# Patient Record
Sex: FEMALE | Race: WHITE | Employment: PART TIME | URBAN - METROPOLITAN AREA
[De-identification: names, ages, dates, MRNs, and addresses within clinical notes are randomized per-mention and may not be internally consistent; named-entity substitution may affect disease eponyms.]

---

## 2022-06-21 ENCOUNTER — TELEPHONE (OUTPATIENT)
Dept: PULMONOLOGY | Age: 47
End: 2022-06-21

## 2022-06-21 DIAGNOSIS — R06.02 SOB (SHORTNESS OF BREATH): Primary | ICD-10-CM

## 2022-07-06 ENCOUNTER — HOSPITAL ENCOUNTER (OUTPATIENT)
Dept: PULMONOLOGY | Age: 47
Discharge: HOME | End: 2022-07-06
Payer: COMMERCIAL

## 2022-07-06 DIAGNOSIS — R06.02 SOB (SHORTNESS OF BREATH): ICD-10-CM

## 2022-07-06 PROCEDURE — 94060 EVALUATION OF WHEEZING: CPT

## 2022-07-06 PROCEDURE — 94726 PLETHYSMOGRAPHY LUNG VOLUMES: CPT

## 2022-07-06 PROCEDURE — 94729 DIFFUSING CAPACITY: CPT

## 2022-07-06 PROCEDURE — 94664 DEMO&/EVAL PT USE INHALER: CPT

## 2022-07-06 PROCEDURE — 94761 N-INVAS EAR/PLS OXIMETRY MLT: CPT

## 2022-07-06 PROCEDURE — 6360000002 HC RX W HCPCS: Performed by: INTERNAL MEDICINE

## 2022-07-06 RX ORDER — ALBUTEROL SULFATE 2.5 MG/3ML
2.5 SOLUTION RESPIRATORY (INHALATION) ONCE
Status: COMPLETED | OUTPATIENT
Start: 2022-07-06 | End: 2022-07-06

## 2022-07-06 RX ADMIN — ALBUTEROL SULFATE 2.5 MG: 2.5 SOLUTION RESPIRATORY (INHALATION) at 10:39

## 2022-07-08 NOTE — PROCEDURES
[address]               [address] [place], [address]                               PULMONARY FUNCTION    PATIENT NAME: Christiano Hammond                    :        1975  MED REC NO:   [id number]                          ROOM:  ACCOUNT NO:   [de-identified]                           ADMIT DATE: 2022  PROVIDER:     Arie Varner MD    DATE OF PROCEDURE:  2022    INDICATION:  Shortness of breath. BMI:  24.3. TOBACCO HISTORY:  Never smoked. Spirometry data is acceptable and reproducible. Lung volumes performed via plethysmography. Albuterol given per protocol. Room air oxygen saturation is 97%. SPIROMETRY:  FEV1/FVC ratio is 73%. Prebronchodilator FEV1 is 2.41,  which is 87% of predicted. Prebronchodilator FVC is 3.32, which is 95%  of predicted. FEF 25%-75% is 1.78, which is 63% of predicted. Postbronchodilator FEF 25%-75% is 2.35, which is 84% of predicted for  32% increase. Lung volumes showed total lung capacity of 4.95, which is 101% of  predicted. Residual volume is 1.72, which is 101% of predicted. Diffusion capacity is 18.29, which is 71% of predicted. IMPRESSION:  1. No evidence of large airway obstruction. 2.  There is decrease flow in small airways that has a significant  response to bronchodilators. 3.  Normal lung volumes. 4.  Mild decrease in diffusion capacity. Asthma could be a possibility if clinically suspected. Bronchial  challenge could be performed for further evaluation.         Criss Alcaraz MD    D: 2022 16:20:05       T: 2022 21:33:48     [email or web address]  Job#: [id number]     Doc#: [id number]    CC:

## 2022-08-15 ENCOUNTER — OFFICE VISIT (OUTPATIENT)
Dept: PULMONOLOGY | Age: 47
End: 2022-08-15
Payer: COMMERCIAL

## 2022-08-15 ENCOUNTER — HOSPITAL ENCOUNTER (OUTPATIENT)
Age: 47
Discharge: HOME | End: 2022-08-15
Payer: COMMERCIAL

## 2022-08-15 ENCOUNTER — HOSPITAL ENCOUNTER (OUTPATIENT)
Dept: GENERAL RADIOLOGY | Age: 47
Discharge: HOME | End: 2022-08-15
Payer: COMMERCIAL

## 2022-08-15 VITALS
OXYGEN SATURATION: 100 % | BODY MASS INDEX: 24.98 KG/M2 | TEMPERATURE: 96.6 F | DIASTOLIC BLOOD PRESSURE: 76 MMHG | HEART RATE: 74 BPM | HEIGHT: 63 IN | SYSTOLIC BLOOD PRESSURE: 101 MMHG | WEIGHT: 141 LBS

## 2022-08-15 DIAGNOSIS — R06.02 SOB (SHORTNESS OF BREATH): ICD-10-CM

## 2022-08-15 DIAGNOSIS — J45.20 MILD INTERMITTENT REACTIVE AIRWAY DISEASE WITHOUT COMPLICATION: Primary | ICD-10-CM

## 2022-08-15 DIAGNOSIS — J45.20 MILD INTERMITTENT REACTIVE AIRWAY DISEASE WITHOUT COMPLICATION: ICD-10-CM

## 2022-08-15 LAB — SEDIMENTATION RATE, ERYTHROCYTE: 7 MM/HR (ref 0–20)

## 2022-08-15 PROCEDURE — 99204 OFFICE O/P NEW MOD 45 MIN: CPT | Performed by: INTERNAL MEDICINE

## 2022-08-15 PROCEDURE — 71046 X-RAY EXAM CHEST 2 VIEWS: CPT

## 2022-08-15 RX ORDER — FLUTICASONE FUROATE AND VILANTEROL 200; 25 UG/1; UG/1
1 POWDER RESPIRATORY (INHALATION) DAILY
Qty: 1 EACH | Refills: 11 | Status: SHIPPED | OUTPATIENT
Start: 2022-08-15 | End: 2022-11-04

## 2022-08-15 RX ORDER — MONTELUKAST SODIUM 10 MG/1
TABLET ORAL
COMMUNITY
End: 2022-08-15

## 2022-08-15 RX ORDER — LORATADINE 10 MG/1
10 TABLET ORAL DAILY
Qty: 30 TABLET | Refills: 1 | Status: SHIPPED | OUTPATIENT
Start: 2022-08-15

## 2022-08-15 RX ORDER — FLUTICASONE PROPIONATE 50 MCG
2 SPRAY, SUSPENSION (ML) NASAL DAILY
Qty: 16 G | Refills: 1 | Status: SHIPPED | OUTPATIENT
Start: 2022-08-15 | End: 2022-10-17

## 2022-08-15 ASSESSMENT — ENCOUNTER SYMPTOMS
WHEEZING: 0
CHEST TIGHTNESS: 0
COUGH: 0
SHORTNESS OF BREATH: 0

## 2022-08-15 NOTE — PROGRESS NOTES
[facility] Pulmonary and Critical Care    Outpatient Note    Subjective:   CHIEF COMPLAINT: No chief complaint on file. HPI:     The patient is 49 y.o. female who presents today for a new patient visit for evaluation of cough which she had on and off over the past few months. She had COVID in Jan 2021, she recovered from it without issues. Start having this cough in August 2021. This went on 1st for 3 months. X ray was negative. She was treated with amoxicillin and prednisone. It got better. However this happened three times. Every time it gets better with steroids. .    Cough is productive with white to green sputum. In June she had \"asthma attack\", she was treated with albuterol. However she used it only twice. At that time it started when she was working outside doing her flower bed when she developed chest pain,  tightness and cough. There was no fever. SOB continued for whole week. She was unable to lay flat. Pulse ox 88% and at that time she had significant post nasal drip  Cough is mainly in am and in the evening. She has nasal allergies. It is described as nasal congestion with possible seasonal variation. Rarely she has GERD  She has indoor can. She also has a dog. Past Medical History:    History reviewed. No pertinent past medical history. Social History:    Social History     Tobacco Use   Smoking Status Never   Smokeless Tobacco Never       Family History:  History reviewed. No pertinent family history. Current Medications:  Current Outpatient Medications on File Prior to Visit   Medication Sig Dispense Refill    montelukast (SINGULAIR) 10 MG tablet montelukast 10 mg tablet       No current facility-administered medications on file prior to visit. REVIEW OF SYSTEMS:    Review of Systems   Constitutional:  Negative for chills, fatigue, fever and unexpected weight change. HENT:  Negative for congestion.     Respiratory:  Negative for cough, chest tightness, shortness of breath and wheezing. Cardiovascular:  Negative for chest pain, palpitations and leg swelling. Neurological:  Negative for weakness. Psychiatric/Behavioral:  The patient is not nervous/anxious. All other systems reviewed and are negative. Objective:   PHYSICAL EXAM:        VITALS:  /76 (Site: Right Upper Arm, Position: Sitting, Cuff Size: Medium Adult)   Pulse 74   Temp (!) 96.6 °F (35.9 °C) (Infrared)   Ht 5' 3\" (1.6 m)   Wt 141 lb (64 kg)   SpO2 100%   BMI 24.98 kg/m²     Physical Exam  Vitals reviewed. Constitutional:       Appearance: She is well-developed. HENT:      Head: Normocephalic and atraumatic. Eyes:      Pupils: Pupils are equal, round, and reactive to light. Neck:      Vascular: No JVD. Cardiovascular:      Rate and Rhythm: Normal rate and regular rhythm. Heart sounds: No murmur heard. Pulmonary:      Effort: Pulmonary effort is normal. No respiratory distress. Breath sounds: Normal breath sounds. No stridor. No wheezing or rales. Abdominal:      General: Bowel sounds are normal.      Palpations: Abdomen is soft. Musculoskeletal:         General: No deformity. Cervical back: Neck supple. Right lower leg: No edema. Left lower leg: No edema. Skin:     General: Skin is warm and dry. Neurological:      Mental Status: She is alert and oriented to person, place, and time. Psychiatric:         Behavior: Behavior normal.       DATA:    CXR on 8/15/22  Impression: No acute cardiopulmonary normality. PFT 07/07/2022     INDICATION:  Shortness of breath. BMI:  24.3. TOBACCO HISTORY:  Never smoked. Spirometry data is acceptable and reproducible. Lung volumes performed via plethysmography. Albuterol given per protocol. Room air oxygen saturation is 97%. SPIROMETRY:  FEV1/FVC ratio is 73%. Prebronchodilator FEV1 is 2.41,  which is 87% of predicted.   Prebronchodilator FVC is 3.32, which is 95%  of predicted. FEF 25%-75% is 1.78, which is 63% of predicted. Postbronchodilator FEF 25%-75% is 2.35, which is 84% of predicted for  32% increase. Lung volumes showed total lung capacity of 4.95, which is 101% of  predicted. Residual volume is 1.72, which is 101% of predicted. Diffusion capacity is 18.29, which is 71% of predicted. IMPRESSION:  1. No evidence of large airway obstruction. 2.  There is decrease flow in small airways that has a significant  response to bronchodilators. 3.  Normal lung volumes. 4.  Mild decrease in diffusion capacity. Asthma could be a possibility if clinically suspected. Bronchial  challenge could be performed for further evaluation    Assessment:      Diagnosis Orders   1. Mild intermittent reactive airway disease without complication  CBC with Auto Differential    Sedimentation Rate    Allergen Profile, Mold    Allergen House Dust Trino    Cat hair, dander, standard IgE    Dog Dander IgE    ALLERGEN COCKROACH, AMERICAN IGE          Plan:   49year old female with recurrent episodes of productive cough with chest tightness and wheezing that usually respond well to prednisone. It is associated with significant post nasal drip. There is no hx of asthma, however she has hx consistent with rhinitis (allergic vs vasomotor)   PFT is suggestive of reactive airway disease. Mild obstruction normalized with bronchodilator. There was 14% reversibility in FEF 25-75, however there was only 6% improvement in FEV1. CXR is within normal.      Suspect reactive airway disease due to post nasal drip vs cough variant asthma with allergic rhinitis.       Plan   Flonase  Claritin   Breo  D/c Singulair   Check sed rate, CBC and common asthma allergens

## 2022-08-17 LAB
ALLERGEN AMERICAN COCKROACH (PERIPLANETA AMERICANA) IGE: <0.1 KU/L
ALLERGEN HOUSE DUST STIER IGE: <0.1 KU/L
ALLERGEN SEE NOTE: NORMAL

## 2022-08-19 LAB — CAT DANDER ANTIBODY: <0.1 KU/L (ref 0–0.34)

## 2022-10-17 RX ORDER — FLUTICASONE PROPIONATE 50 MCG
SPRAY, SUSPENSION (ML) NASAL
Qty: 16 G | Refills: 2 | Status: SHIPPED | OUTPATIENT
Start: 2022-10-17 | End: 2022-11-04 | Stop reason: SDUPTHER

## 2022-11-04 ENCOUNTER — OFFICE VISIT (OUTPATIENT)
Dept: PULMONOLOGY | Age: 47
End: 2022-11-04
Payer: COMMERCIAL

## 2022-11-04 VITALS
BODY MASS INDEX: 24.98 KG/M2 | DIASTOLIC BLOOD PRESSURE: 76 MMHG | HEART RATE: 87 BPM | SYSTOLIC BLOOD PRESSURE: 111 MMHG | OXYGEN SATURATION: 99 % | WEIGHT: 141 LBS | HEIGHT: 63 IN

## 2022-11-04 DIAGNOSIS — J45.20 MILD INTERMITTENT ASTHMA WITHOUT COMPLICATION: Primary | ICD-10-CM

## 2022-11-04 DIAGNOSIS — J30.9 ALLERGIC RHINITIS, UNSPECIFIED SEASONALITY, UNSPECIFIED TRIGGER: ICD-10-CM

## 2022-11-04 PROCEDURE — 99214 OFFICE O/P EST MOD 30 MIN: CPT | Performed by: INTERNAL MEDICINE

## 2022-11-04 RX ORDER — ALBUTEROL SULFATE 90 UG/1
2 AEROSOL, METERED RESPIRATORY (INHALATION) 4 TIMES DAILY PRN
Qty: 18 G | Refills: 5 | Status: SHIPPED | OUTPATIENT
Start: 2022-11-04

## 2022-11-04 RX ORDER — FLUTICASONE PROPIONATE 50 MCG
2 SPRAY, SUSPENSION (ML) NASAL DAILY
Qty: 16 G | Refills: 5 | Status: SHIPPED | OUTPATIENT
Start: 2022-11-04

## 2022-11-04 ASSESSMENT — ENCOUNTER SYMPTOMS
CHEST TIGHTNESS: 0
SHORTNESS OF BREATH: 0
COUGH: 0
WHEEZING: 0

## 2022-11-04 NOTE — PROGRESS NOTES
[facility] Pulmonary and Critical Care    Outpatient Note    Subjective:   CHIEF COMPLAINT:   Chief Complaint   Patient presents with    Follow-up       Interval history on 11/4/22  Overall she feels better. She did not use albuterol at all over the past two months. HPI:  8/15/22   The patient is 49 y.o. female who presents today for a new patient visit for evaluation of cough which she had on and off over the past few months. She had COVID in Jan 2021, she recovered from it without issues. Start having this cough in August 2021. This went on 1st for 3 months. X ray was negative. She was treated with amoxicillin and prednisone. It got better. However this happened three times. Every time it gets better with steroids. .    Cough is productive with white to green sputum. In June she had \"asthma attack\", she was treated with albuterol. However she used it only twice. At that time it started when she was working outside doing her flower bed when she developed chest pain,  tightness and cough. There was no fever. SOB continued for whole week. She was unable to lay flat. Pulse ox 88% and at that time she had significant post nasal drip  Cough is mainly in am and in the evening. She has nasal allergies. It is described as nasal congestion with possible seasonal variation. Rarely she has GERD  She has indoor can. She also has a dog. Past Medical History:    History reviewed. No pertinent past medical history. Social History:    Social History     Tobacco Use   Smoking Status Never   Smokeless Tobacco Never       Family History:  History reviewed. No pertinent family history. Current Medications:  Current Outpatient Medications on File Prior to Visit   Medication Sig Dispense Refill    loratadine (CLARITIN) 10 MG tablet Take 1 tablet by mouth in the morning. 30 tablet 1     No current facility-administered medications on file prior to visit.        REVIEW OF SYSTEMS: Review of Systems   Constitutional:  Negative for chills, fatigue, fever and unexpected weight change. HENT:  Negative for congestion. Respiratory:  Negative for cough, chest tightness, shortness of breath and wheezing. Cardiovascular:  Negative for chest pain, palpitations and leg swelling. Neurological:  Negative for weakness. Psychiatric/Behavioral:  The patient is not nervous/anxious. All other systems reviewed and are negative. Objective:   PHYSICAL EXAM:        VITALS:  /76 (Site: Right Upper Arm, Position: Sitting, Cuff Size: Medium Adult)   Pulse 87   Ht 5' 3\" (1.6 m)   Wt 141 lb (64 kg)   SpO2 99%   BMI 24.98 kg/m²     Physical Exam  Vitals reviewed. Constitutional:       Appearance: She is well-developed. HENT:      Head: Normocephalic and atraumatic. Eyes:      Pupils: Pupils are equal, round, and reactive to light. Neck:      Vascular: No JVD. Cardiovascular:      Rate and Rhythm: Normal rate and regular rhythm. Heart sounds: No murmur heard. Pulmonary:      Effort: Pulmonary effort is normal. No respiratory distress. Breath sounds: Normal breath sounds. No stridor. No wheezing or rales. Abdominal:      General: Bowel sounds are normal.      Palpations: Abdomen is soft. Musculoskeletal:         General: No deformity. Cervical back: Neck supple. Right lower leg: No edema. Left lower leg: No edema. Skin:     General: Skin is warm and dry. Neurological:      Mental Status: She is alert and oriented to person, place, and time. Psychiatric:         Behavior: Behavior normal.       DATA:    CXR on 8/15/22  Impression: No acute cardiopulmonary normality. PFT 07/07/2022     INDICATION:  Shortness of breath. BMI:  24.3. TOBACCO HISTORY:  Never smoked. Spirometry data is acceptable and reproducible. Lung volumes performed via plethysmography. Albuterol given per protocol. Room air oxygen saturation is 97%. SPIROMETRY:  FEV1/FVC ratio is 73%. Prebronchodilator FEV1 is 2.41,  which is 87% of predicted. Prebronchodilator FVC is 3.32, which is 95%  of predicted. FEF 25%-75% is 1.78, which is 63% of predicted. Postbronchodilator FEF 25%-75% is 2.35, which is 84% of predicted for  32% increase. Lung volumes showed total lung capacity of 4.95, which is 101% of  predicted. Residual volume is 1.72, which is 101% of predicted. Diffusion capacity is 18.29, which is 71% of predicted. IMPRESSION:  1. No evidence of large airway obstruction. 2.  There is decrease flow in small airways that has a significant  response to bronchodilators. 3.  Normal lung volumes. 4.  Mild decrease in diffusion capacity. Asthma could be a possibility if clinically suspected. Bronchial  challenge could be performed for further evaluation    Assessment:      Diagnosis Orders   1. Mild intermittent asthma without complication        2. Allergic rhinitis, unspecified seasonality, unspecified trigger              Plan:   49year old female with recurrent episodes of productive cough with chest tightness and wheezing that usually respond well to prednisone. It is associated with significant post nasal drip. hx consistent with rhinitis (allergic vs vasomotor) and asthma. PFT is suggestive of reactive airway disease. Mild obstruction normalized with bronchodilator. There was 14% reversibility in FEF 25-75, however there was only 6% improvement in FEV1. CXR is within normal.      She has significant improvement with flonase and breo. Did not have to use albuterol at all over the past 2 months. She is happy about her progress. Plan   Continue flonase  De escalate breo to 100  Albuterol PRN    Use breo 100 for 2 months. Asthma action plan discussed. If symptoms remain controlled for two months she can stop breo. If start using albuterol > 2 times a week or once at night to restart breo for 1-2 months.

## 2023-10-25 ENCOUNTER — TELEPHONE (OUTPATIENT)
Dept: PULMONOLOGY | Age: 48
End: 2023-10-25

## 2023-10-25 NOTE — TELEPHONE ENCOUNTER
Patient requests refill for Breo Ellipta 100- generic equivalent  Please enter new order. Past one could not be pended here.     Jose Miguel on [employer]. In [employer]

## 2023-10-26 RX ORDER — FLUTICASONE PROPIONATE AND SALMETEROL 100; 50 UG/1; UG/1
1 POWDER RESPIRATORY (INHALATION) EVERY 12 HOURS
Qty: 60 EACH | Refills: 2 | Status: SHIPPED | OUTPATIENT
Start: 2023-10-26

## 2023-12-06 ENCOUNTER — OFFICE VISIT (OUTPATIENT)
Dept: PULMONOLOGY | Age: 48
End: 2023-12-06
Payer: COMMERCIAL

## 2023-12-06 VITALS
BODY MASS INDEX: 26.58 KG/M2 | WEIGHT: 150 LBS | DIASTOLIC BLOOD PRESSURE: 68 MMHG | HEIGHT: 63 IN | OXYGEN SATURATION: 97 % | HEART RATE: 91 BPM | SYSTOLIC BLOOD PRESSURE: 126 MMHG

## 2023-12-06 DIAGNOSIS — J45.40 MODERATE PERSISTENT ASTHMA WITHOUT COMPLICATION: Primary | ICD-10-CM

## 2023-12-06 PROCEDURE — 99214 OFFICE O/P EST MOD 30 MIN: CPT | Performed by: INTERNAL MEDICINE

## 2023-12-06 ASSESSMENT — ENCOUNTER SYMPTOMS
WHEEZING: 0
COUGH: 0
SHORTNESS OF BREATH: 0
CHEST TIGHTNESS: 0

## 2023-12-06 NOTE — PROGRESS NOTES
[facility] Pulmonary and Critical Care    Outpatient Note    Subjective:   CHIEF COMPLAINT:   Chief Complaint   Patient presents with    1 Year Follow Up     Interval history on 12/6/23  Overall she is better but has residual congestion for which she has to cough. She feels Breo 100 is not enough during Winter time. However after checking she was using Advair 100 once a day. She has sinus congestion but doesn't feel post nasal drip. She is using flonase. Interval history on 11/4/22  Overall she feels better. She did not use albuterol at all over the past two months. HPI:  8/15/22   The patient is 51 y.o. female who presents today for a new patient visit for evaluation of cough which she had on and off over the past few months. She had COVID in Jan 2021, she recovered from it without issues. Start having this cough in August 2021. This went on 1st for 3 months. X ray was negative. She was treated with amoxicillin and prednisone. It got better. However this happened three times. Every time it gets better with steroids. .    Cough is productive with white to green sputum. In June she had \"asthma attack\", she was treated with albuterol. However she used it only twice. At that time it started when she was working outside doing her flower bed when she developed chest pain,  tightness and cough. There was no fever. SOB continued for whole week. She was unable to lay flat. Pulse ox 88% and at that time she had significant post nasal drip  Cough is mainly in am and in the evening. She has nasal allergies. It is described as nasal congestion with possible seasonal variation. Rarely she has GERD  She has indoor can. She also has a dog. Past Medical History:    No past medical history on file. Social History:    Social History     Tobacco Use   Smoking Status Never   Smokeless Tobacco Never       Family History:  No family history on file.     Current

## 2023-12-29 RX ORDER — FLUTICASONE FUROATE AND VILANTEROL TRIFENATATE 200; 25 UG/1; UG/1
POWDER RESPIRATORY (INHALATION)
Qty: 1 EACH | Refills: 11 | Status: SHIPPED | OUTPATIENT
Start: 2023-12-29

## 2023-12-29 NOTE — TELEPHONE ENCOUNTER
Patient of Dr Gena Staley   If appropriate please refill pending medication   Last office visit : 12/6/2023  Next office visit : 12/11/2024  Thank you

## 2024-12-11 ENCOUNTER — OFFICE VISIT (OUTPATIENT)
Dept: PULMONOLOGY | Age: 49
End: 2024-12-11

## 2024-12-11 VITALS
OXYGEN SATURATION: 99 % | WEIGHT: 146 LBS | DIASTOLIC BLOOD PRESSURE: 70 MMHG | BODY MASS INDEX: 25.87 KG/M2 | HEIGHT: 63 IN | HEART RATE: 87 BPM | RESPIRATION RATE: 13 BRPM | SYSTOLIC BLOOD PRESSURE: 126 MMHG

## 2024-12-11 DIAGNOSIS — J45.40 MODERATE PERSISTENT ASTHMA WITHOUT COMPLICATION: Primary | ICD-10-CM

## 2024-12-11 RX ORDER — FLUTICASONE FUROATE AND VILANTEROL 200; 25 UG/1; UG/1
1 POWDER RESPIRATORY (INHALATION) DAILY
Qty: 3 EACH | Refills: 3 | Status: SHIPPED | OUTPATIENT
Start: 2024-12-11

## 2024-12-11 RX ORDER — LORATADINE 10 MG/1
10 TABLET ORAL DAILY
Qty: 30 TABLET | Refills: 11 | Status: SHIPPED | OUTPATIENT
Start: 2024-12-11 | End: 2024-12-11

## 2024-12-11 RX ORDER — LORATADINE 10 MG/1
10 TABLET ORAL DAILY
Qty: 90 TABLET | Refills: 3 | Status: SHIPPED | OUTPATIENT
Start: 2024-12-11

## 2024-12-11 RX ORDER — FLUTICASONE PROPIONATE 50 MCG
2 SPRAY, SUSPENSION (ML) NASAL DAILY
Qty: 16 G | Refills: 11 | Status: SHIPPED | OUTPATIENT
Start: 2024-12-11

## 2024-12-11 ASSESSMENT — ENCOUNTER SYMPTOMS
COUGH: 0
SHORTNESS OF BREATH: 0
CHEST TIGHTNESS: 0
WHEEZING: 0

## 2024-12-11 NOTE — PROGRESS NOTES
[facility] Pulmonary and Critical Care    Outpatient Note    Subjective:   CHIEF COMPLAINT:   Chief Complaint   Patient presents with    Asthma     Interval history on 12/11/24  Overall she is doing fine.  She is using breo and claritin regularly, and flonase intermittently.  Asthma is well controlled. She is not requiring albuterol.  No exacerbation over the past year.      Interval history on 12/6/23  Overall she is better but has residual congestion for which she has to cough.  She feels Breo 100 is not enough during Winter time.  However after checking she was using Advair 100 once a day.    She has sinus congestion but doesn't feel post nasal drip.  She is using flonase.      Interval history on 11/4/22  Overall she feels better.  She did not use albuterol at all over the past two months.      HPI:  8/15/22   The patient is 49 y.o. female who presents today for a new patient visit for evaluation of cough which she had on and off over the past few months.    She had COVID in Jan 2021, she recovered from it without issues.    Start having this cough in August 2021.  This went on 1st for 3 months.  X ray was negative. She was treated with amoxicillin and prednisone.  It got better.  However this happened three times.  Every time it gets better with steroids.  .    Cough is productive with white to green sputum.    In June she had \"asthma attack\", she was treated with albuterol.  However she used it only twice.  At that time it started when she was working outside doing her flower bed when she developed chest pain,  tightness and cough.  There was no fever.   SOB continued for whole week.  She was unable to lay flat. Pulse ox 88% and at that time she had significant post nasal drip  Cough is mainly in am and in the evening.    She has nasal allergies.  It is described as nasal congestion with possible seasonal variation.      Rarely she has GERD  She has indoor can.  She also has a dog.     Past Medical